# Patient Record
Sex: FEMALE | Race: WHITE | URBAN - METROPOLITAN AREA
[De-identification: names, ages, dates, MRNs, and addresses within clinical notes are randomized per-mention and may not be internally consistent; named-entity substitution may affect disease eponyms.]

---

## 2019-02-06 ENCOUNTER — TELEPHONE (OUTPATIENT)
Dept: OTHER | Facility: CLINIC | Age: 59
End: 2019-02-06

## 2019-02-06 NOTE — TELEPHONE ENCOUNTER
2/6/2019    Call Regarding Onboarding [employer] PLUS OTHER    Attempt 1    Message on voicemail     Comments: 1 ADULT      Outreach   CC